# Patient Record
Sex: MALE | Race: NATIVE HAWAIIAN OR OTHER PACIFIC ISLANDER
[De-identification: names, ages, dates, MRNs, and addresses within clinical notes are randomized per-mention and may not be internally consistent; named-entity substitution may affect disease eponyms.]

---

## 2018-09-18 ENCOUNTER — HOSPITAL ENCOUNTER (EMERGENCY)
Dept: HOSPITAL 14 - H.ER | Age: 23
LOS: 1 days | Discharge: HOME | End: 2018-09-19
Payer: COMMERCIAL

## 2018-09-18 VITALS — RESPIRATION RATE: 16 BRPM

## 2018-09-18 DIAGNOSIS — S92.352A: ICD-10-CM

## 2018-09-18 DIAGNOSIS — S00.83XA: Primary | ICD-10-CM

## 2018-09-18 DIAGNOSIS — F10.10: ICD-10-CM
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
## 2018-09-18 DIAGNOSIS — F17.210: ICD-10-CM
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
## 2018-09-19 VITALS — DIASTOLIC BLOOD PRESSURE: 71 MMHG | HEART RATE: 72 BPM | TEMPERATURE: 97.9 F | SYSTOLIC BLOOD PRESSURE: 127 MMHG
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
## 2018-09-21 VITALS — OXYGEN SATURATION: 96 %
